# Patient Record
(demographics unavailable — no encounter records)

---

## 2025-07-03 NOTE — DISCUSSION/SUMMARY
[de-identified] : Right hip and bilateral knee pain  HPI Patient is a 65-year-old female who reports to office for subsequent evaluation of her right hip pain.  She had a right hip arthroscopy/bursectomy and IT band release done in 11/23/2022.  She states that she is still having pain in her right hip.  She has completed over 6 weeks of formal physical therapy over the past 3 months which have given her no relief.  She also states that she has been having bilateral knee pain.  She believes that this is due to the way she has been walking.  Admits to knee weakness.  Admits that the right knee hany/gives out on her occasionally.  Walking, up/down stairs, getting up from seated position, certain range of motion aggravate the hip and knee pain.  Denies any numbness or tingling.  Right hip with pelvic view x-ray taken in office today revealed no obvious fractures, subluxations, or dislocations.  Mild degenerative/arthritic changes noted.  Otherwise, no other significant abnormalities were seen.  Right hip exam is as follows: No swelling noted.  No erythema or ecchymosis.  TTP greater trochanteric bursa, and slightly over gluteus medius tendon region.  Able to perform active straight leg raise.  Mild limited range of motion of hip secondary to pain.  Positive impingement testing.  Quad/hamstring strength 4/5 with discomfort.  Light touch intact throughout.  Mild antalgic gait.  Bilateral knee x-rays taken in office today revealed no obvious fractures, subluxations, or dislocations.  Mild medial joint and patellofemoral joint space narrowing.  Mild degenerative/arthritic changes noted.  Otherwise, no other significant abnormalities were seen.  Bilateral knee exam is as follows: Minimal effusion noted.  No erythema or ecchymosis.  Able to perform active straight leg raise.  Knee flexion from 0 to 110 degrees with mild stiffness and pain.  Medial joint line tenderness palpation.  Calf is soft and nontender.  Equivocal Sofia's.  Light touch intact throughout.  Mild antalgic gait.  Assessment/plan Explained hip x-ray findings.  Patient is still having pain in her right hip.  She has failed conservative management over the past several months.  Right hip MRI ordered for further evaluation.  Patient was advised to call the office a few days after getting the MRI done to discuss results over the phone.  Regarding her bilateral knee, explained mild OA on x-ray as well.  We will treat this conservatively for now.  OTC Tylenol or NSAIDs as needed for pain.  The hip and knee conditioning program from the [facility] was given to the patient so they may try that at home.  Follow-up in 2 months for further evaluation.  May consider knee injections in future if still symptomatic.  All question/concerns were answered in detail.